# Patient Record
Sex: FEMALE | Race: WHITE | NOT HISPANIC OR LATINO | Employment: FULL TIME
[De-identification: names, ages, dates, MRNs, and addresses within clinical notes are randomized per-mention and may not be internally consistent; named-entity substitution may affect disease eponyms.]

---

## 2020-03-01 ENCOUNTER — HEALTH MAINTENANCE LETTER (OUTPATIENT)
Age: 30
End: 2020-03-01

## 2020-12-13 ENCOUNTER — HEALTH MAINTENANCE LETTER (OUTPATIENT)
Age: 30
End: 2020-12-13

## 2021-04-17 ENCOUNTER — HEALTH MAINTENANCE LETTER (OUTPATIENT)
Age: 31
End: 2021-04-17

## 2021-09-26 ENCOUNTER — HEALTH MAINTENANCE LETTER (OUTPATIENT)
Age: 31
End: 2021-09-26

## 2022-05-08 ENCOUNTER — HEALTH MAINTENANCE LETTER (OUTPATIENT)
Age: 32
End: 2022-05-08

## 2022-08-31 ENCOUNTER — HOSPITAL ENCOUNTER (EMERGENCY)
Facility: HOSPITAL | Age: 32
Discharge: HOME | End: 2022-08-31
Attending: STUDENT IN AN ORGANIZED HEALTH CARE EDUCATION/TRAINING PROGRAM | Admitting: STUDENT IN AN ORGANIZED HEALTH CARE EDUCATION/TRAINING PROGRAM
Payer: COMMERCIAL

## 2022-08-31 ENCOUNTER — APPOINTMENT (OUTPATIENT)
Dept: RADIOLOGY | Facility: HOSPITAL | Age: 32
End: 2022-08-31
Attending: STUDENT IN AN ORGANIZED HEALTH CARE EDUCATION/TRAINING PROGRAM
Payer: COMMERCIAL

## 2022-08-31 VITALS
WEIGHT: 250 LBS | HEIGHT: 64 IN | OXYGEN SATURATION: 99 % | TEMPERATURE: 98.5 F | RESPIRATION RATE: 16 BRPM | BODY MASS INDEX: 42.68 KG/M2 | HEART RATE: 90 BPM | SYSTOLIC BLOOD PRESSURE: 147 MMHG | DIASTOLIC BLOOD PRESSURE: 81 MMHG

## 2022-08-31 DIAGNOSIS — R00.0 TACHYCARDIA: ICD-10-CM

## 2022-08-31 LAB
ALBUMIN SERPL-MCNC: 4.6 G/DL (ref 3.5–5)
ALP SERPL-CCNC: 95 U/L (ref 45–120)
ALT SERPL W P-5'-P-CCNC: 18 U/L (ref 0–45)
ANION GAP SERPL CALCULATED.3IONS-SCNC: 15 MMOL/L (ref 5–18)
AST SERPL W P-5'-P-CCNC: 19 U/L (ref 0–40)
ATRIAL RATE - MUSE: 164 BPM
BASOPHILS # BLD MANUAL: 0 10E3/UL (ref 0–0.2)
BASOPHILS NFR BLD MANUAL: 0 %
BILIRUB SERPL-MCNC: 0.7 MG/DL (ref 0–1)
BUN SERPL-MCNC: 16 MG/DL (ref 8–22)
CALCIUM SERPL-MCNC: 10.1 MG/DL (ref 8.5–10.5)
CHLORIDE BLD-SCNC: 107 MMOL/L (ref 98–107)
CO2 SERPL-SCNC: 20 MMOL/L (ref 22–31)
CREAT SERPL-MCNC: 1.08 MG/DL (ref 0.6–1.1)
D DIMER PPP FEU-MCNC: 0.42 UG/ML FEU (ref 0–0.5)
DIASTOLIC BLOOD PRESSURE - MUSE: NORMAL MMHG
EOSINOPHIL # BLD MANUAL: 0 10E3/UL (ref 0–0.7)
EOSINOPHIL NFR BLD MANUAL: 0 %
ERYTHROCYTE [DISTWIDTH] IN BLOOD BY AUTOMATED COUNT: 12.4 % (ref 10–15)
GFR SERPL CREATININE-BSD FRML MDRD: 69 ML/MIN/1.73M2
GLUCOSE BLD-MCNC: 149 MG/DL (ref 70–125)
HCT VFR BLD AUTO: 42.8 % (ref 35–47)
HGB BLD-MCNC: 14.6 G/DL (ref 11.7–15.7)
HOLD SPECIMEN: NORMAL
INTERPRETATION ECG - MUSE: NORMAL
LYMPHOCYTES # BLD MANUAL: 4.9 10E3/UL (ref 0.8–5.3)
LYMPHOCYTES NFR BLD MANUAL: 29 %
MAGNESIUM SERPL-MCNC: 1.9 MG/DL (ref 1.8–2.6)
MCH RBC QN AUTO: 28.9 PG (ref 26.5–33)
MCHC RBC AUTO-ENTMCNC: 34.1 G/DL (ref 31.5–36.5)
MCV RBC AUTO: 85 FL (ref 78–100)
MONOCYTES # BLD MANUAL: 1.2 10E3/UL (ref 0–1.3)
MONOCYTES NFR BLD MANUAL: 7 %
NEUTROPHILS # BLD MANUAL: 10.8 10E3/UL (ref 1.6–8.3)
NEUTROPHILS NFR BLD MANUAL: 64 %
P AXIS - MUSE: 58 DEGREES
PLAT MORPH BLD: ABNORMAL
PLATELET # BLD AUTO: 543 10E3/UL (ref 150–450)
POTASSIUM BLD-SCNC: 3.2 MMOL/L (ref 3.5–5)
PR INTERVAL - MUSE: 114 MS
PROT SERPL-MCNC: 8.7 G/DL (ref 6–8)
QRS DURATION - MUSE: 66 MS
QT - MUSE: 300 MS
QTC - MUSE: 495 MS
R AXIS - MUSE: 72 DEGREES
RBC # BLD AUTO: 5.06 10E6/UL (ref 3.8–5.2)
RBC MORPH BLD: ABNORMAL
SODIUM SERPL-SCNC: 142 MMOL/L (ref 136–145)
SYSTOLIC BLOOD PRESSURE - MUSE: NORMAL MMHG
T AXIS - MUSE: 63 DEGREES
TROPONIN I SERPL-MCNC: 0.01 NG/ML (ref 0–0.29)
TSH SERPL DL<=0.005 MIU/L-ACNC: 1.69 UIU/ML (ref 0.3–5)
VENTRICULAR RATE- MUSE: 164 BPM
WBC # BLD AUTO: 16.9 10E3/UL (ref 4–11)

## 2022-08-31 PROCEDURE — 99285 EMERGENCY DEPT VISIT HI MDM: CPT | Mod: 25

## 2022-08-31 PROCEDURE — 80053 COMPREHEN METABOLIC PANEL: CPT | Performed by: STUDENT IN AN ORGANIZED HEALTH CARE EDUCATION/TRAINING PROGRAM

## 2022-08-31 PROCEDURE — 93005 ELECTROCARDIOGRAM TRACING: CPT | Performed by: STUDENT IN AN ORGANIZED HEALTH CARE EDUCATION/TRAINING PROGRAM

## 2022-08-31 PROCEDURE — 96374 THER/PROPH/DIAG INJ IV PUSH: CPT

## 2022-08-31 PROCEDURE — 84443 ASSAY THYROID STIM HORMONE: CPT | Performed by: STUDENT IN AN ORGANIZED HEALTH CARE EDUCATION/TRAINING PROGRAM

## 2022-08-31 PROCEDURE — 82040 ASSAY OF SERUM ALBUMIN: CPT | Performed by: STUDENT IN AN ORGANIZED HEALTH CARE EDUCATION/TRAINING PROGRAM

## 2022-08-31 PROCEDURE — 250N000011 HC RX IP 250 OP 636: Performed by: STUDENT IN AN ORGANIZED HEALTH CARE EDUCATION/TRAINING PROGRAM

## 2022-08-31 PROCEDURE — 85027 COMPLETE CBC AUTOMATED: CPT | Performed by: STUDENT IN AN ORGANIZED HEALTH CARE EDUCATION/TRAINING PROGRAM

## 2022-08-31 PROCEDURE — 250N000013 HC RX MED GY IP 250 OP 250 PS 637: Performed by: STUDENT IN AN ORGANIZED HEALTH CARE EDUCATION/TRAINING PROGRAM

## 2022-08-31 PROCEDURE — 84484 ASSAY OF TROPONIN QUANT: CPT | Performed by: STUDENT IN AN ORGANIZED HEALTH CARE EDUCATION/TRAINING PROGRAM

## 2022-08-31 PROCEDURE — 85379 FIBRIN DEGRADATION QUANT: CPT | Performed by: STUDENT IN AN ORGANIZED HEALTH CARE EDUCATION/TRAINING PROGRAM

## 2022-08-31 PROCEDURE — 258N000003 HC RX IP 258 OP 636: Performed by: STUDENT IN AN ORGANIZED HEALTH CARE EDUCATION/TRAINING PROGRAM

## 2022-08-31 PROCEDURE — 36415 COLL VENOUS BLD VENIPUNCTURE: CPT | Performed by: STUDENT IN AN ORGANIZED HEALTH CARE EDUCATION/TRAINING PROGRAM

## 2022-08-31 PROCEDURE — 85007 BL SMEAR W/DIFF WBC COUNT: CPT | Performed by: STUDENT IN AN ORGANIZED HEALTH CARE EDUCATION/TRAINING PROGRAM

## 2022-08-31 PROCEDURE — 96361 HYDRATE IV INFUSION ADD-ON: CPT

## 2022-08-31 PROCEDURE — 83735 ASSAY OF MAGNESIUM: CPT | Performed by: STUDENT IN AN ORGANIZED HEALTH CARE EDUCATION/TRAINING PROGRAM

## 2022-08-31 PROCEDURE — 71046 X-RAY EXAM CHEST 2 VIEWS: CPT

## 2022-08-31 RX ORDER — LORAZEPAM 2 MG/ML
1 INJECTION INTRAMUSCULAR ONCE
Status: COMPLETED | OUTPATIENT
Start: 2022-08-31 | End: 2022-08-31

## 2022-08-31 RX ORDER — POTASSIUM CHLORIDE 1500 MG/1
40 TABLET, EXTENDED RELEASE ORAL ONCE
Status: COMPLETED | OUTPATIENT
Start: 2022-08-31 | End: 2022-08-31

## 2022-08-31 RX ADMIN — SODIUM CHLORIDE, POTASSIUM CHLORIDE, SODIUM LACTATE AND CALCIUM CHLORIDE 1000 ML: 600; 310; 30; 20 INJECTION, SOLUTION INTRAVENOUS at 12:14

## 2022-08-31 RX ADMIN — LORAZEPAM 1 MG: 2 INJECTION INTRAMUSCULAR; INTRAVENOUS at 12:14

## 2022-08-31 RX ADMIN — POTASSIUM CHLORIDE 40 MEQ: 1500 TABLET, EXTENDED RELEASE ORAL at 14:15

## 2022-08-31 ASSESSMENT — ENCOUNTER SYMPTOMS
LIGHT-HEADEDNESS: 1
NAUSEA: 0
DYSURIA: 0
NERVOUS/ANXIOUS: 1
PALPITATIONS: 1
SHORTNESS OF BREATH: 1
SORE THROAT: 0
CHILLS: 0
VOMITING: 0
DIZZINESS: 0
ABDOMINAL PAIN: 0
SPEECH DIFFICULTY: 0
COUGH: 0
NUMBNESS: 0
DIARRHEA: 0
FEVER: 0
WEAKNESS: 0
CHEST TIGHTNESS: 1
HEADACHES: 0

## 2022-08-31 ASSESSMENT — ACTIVITIES OF DAILY LIVING (ADL): ADLS_ACUITY_SCORE: 35

## 2022-08-31 NOTE — ED NOTES
ER DISCHARGE NOTE:     Magi Trujillor  MRN: [id number]  (R00.0) Tachycardia        Magi Trujillor discharged via on foot with .    Verbalized understanding.    AVS in hand.         08/31/22 1330   Departure Condition   Departure Condition Stable   Mobility at Departure Ambulatory   Departure Mode By self;With spouse / significant other   Vital Signs   BP (!) 147/81   BP - Mean 105   Patient Position Lying   Pulse 90   Pulse Rate Source Monitor   Resp 16   SpO2 99 %   O2 Device None (Room air)   Capillary Refill, General less than/equal to 3 secs   [place] Coma Scale   Best Eye Response 4-->(E4) spontaneous   Best Motor Response 6-->(M6) obeys commands   Best Verbal Response 5-->(V5) oriented   [place] Coma Scale Score 15

## 2022-08-31 NOTE — ED PROVIDER NOTES
"Emergency Department Midlevel Supervisory Note     I personally saw the patient and performed a substantive portion of the visit including all aspects of the medical decision making.    ED Course:  11:56 AM Muna Wagner PA-C staffed patient with me. I agree with their assessment and plan of management, and I will see the patient.  12:08 PM I met with the patient to introduce myself, gather additional history, perform my initial exam, and discuss the plan.   12:51 PM Checked in on and updated patient. Patient has significantly improved.       Brief HPI:     Magi Messina is a 32 year old female who presents for evaluation of tachycardia. Patient was at home working on the computer when she started feeling her heart race. Patient stated she felt an increase in stress over the past couple of weeks. Patient had COVID-19 two weeks prior.     I, Magi Flower, am serving as a scribe to document services personally performed by Denny Barajas DO., based on my observations and the provider's statements to me.   I, Denny Barajas DO., attest that Magi Flower was acting in a scribe capacity, has observed my performance of the services and has documented them in accordance with my direction.      Brief Physical Exam: BP (!) 143/81   Pulse 95   Temp 98.5  F (36.9  C) (Oral)   Resp 15   Ht 1.626 m (5' 4\")   Wt 113.4 kg (250 lb)   SpO2 99%   BMI 42.91 kg/m    Constitutional:  Alert, in no acute distress  EYES: Conjunctivae clear  HENT:  Atraumatic, normocephalic  Respiratory:  Respirations even, unlabored, in no acute respiratory distress  Cardiovascular:  tachycardia, good peripheral perfusion  GI: Soft, nondistended, nontender, no palpable masses, no rebound, no guarding   Musculoskeletal:  No edema. No cyanosis. Range of motion major extremities intact.    Integument: Warm, Dry, No erythema, No rash.   Neurologic:  Alert & oriented, no focal deficits noted  Psych: Normal mood and affect       MDM:      Patient is a morbidly " obese 32-year-old no chronic medical problems, here with sudden onset panic-like sensation.  Tachycardia.  Palpitations.  States has had increased stress in her life.  Otherwise have been doing well this week otherwise.    On arrival here patient's heart rates in the 170s.  She looks well clinically.  Heart rate is regular.  Her blood pressure is normal.  She has no fever.  She has normal oxygenation.  She looks mildly anxious.    Examination otherwise unremarkable.  Plan for basic labs, rule out PE as well as thyroid storm also try small dose of Ativan and fluids and then reevaluate patient.    - Reevaluation approxi-1 PM: Patient is feeling much better.  Heart rates in the 90s.  Awaiting medical work-up.  - patient signed out to oncoming doc pending workup    Diagnosis: Tachycardia.    Labs and Imaging:     I have reviewed the relevant laboratory and radiology studies      Procedures:  I was present for the key portions of this procedure: none    Denny Barajas DO.  [facility] EMERGENCY DEPARTMENT  [facility] [facility] [address] [phone]       Denny Barajas DO  08/31/22 0865

## 2022-08-31 NOTE — DISCHARGE INSTRUCTIONS
Please bring this paperwork with you to your follow-up appointment.    You were seen in the urgent care/emergency department for heart racing and shortness of breath.     Although we are unsure of the cause of your symptoms, we were able to rule out any emergent causes. Your blood work and chest xray look good today. We do not think you are having a heart attack or clot in your lungs. Your potassium was a little low today, but otherwise your electrolytes look normal.     It is possible that this was a panic attack or caused by anxiety. It might be a good idea to follow up with your regular doctor about changing or adding to your anxiety medication for break through panic episodes. They may also want to refer you to a therapist.     For your symptoms:  Please stay well hydrated to prevent dehydration.     Tylenol/ibuprofen as needed for chest discomfort  You may take up to 650 mg of Tylenol (acetaminophen) up to 4 times daily and up to 600 mg of ibuprofen up to 4 times daily as needed for fever, pain.  Please do not take more than the daily maximum recommended dose (tylenol = 4 grams, ibuprofen = 2.4 grams) as it can cause harm to your liver, kidneys, stomach.  It is best to take ibuprofen with food. Please read labels of any over-the-counter medicine you may be taking as it may contain Tylenol (acetaminophen) or Advil (ibuprofen).     Follow up with your primary care provider for recheck in 3 days for ER follow up.    Return to the emergency department if you develop worsening chest pain, shortness of breath, passing out, or any other new worsening or concerning symptoms. We'd be happy to see you again.    Thank you for allowing us to be part of your care today.    Take care!  -Magi Hidalgo PA-C

## 2022-08-31 NOTE — ED PROVIDER NOTES
EMERGENCY DEPARTMENT ENCOUNTER      NAME: Magi Messina  AGE: 32 year old female  YOB: 1990  MRN: [id number]  EVALUATION DATE & TIME: 8/31/2022 11:36 AM    PCP: [facility]    ED PROVIDER: Magi Hidalgo PA-C      Chief Complaint   Patient presents with     Tachycardia     FINAL IMPRESSION:  1. Tachycardia      MEDICAL DECISION MAKING:    Pertinent Labs & Imaging studies reviewed. (See chart for details)  Magi Messina is a 32 year old female with a pertinent history of HTN, anxiety, depression who presents to this ED for evaluation of palpitations. Acute onset of heart racing, anxiety and shortness of breath around 10:30 this morning. H/o panic attacks, but this feels different and has lasted longer than normal. Recent increase of stressors in life. Diagnosed with covid 2 weeks ago.     On my initial evaluation, patient hypertensive and tachycardic. Tachycardia started to improve even during our conversation. On physical exam patient is very anxious appearing, tearful. Otherwise, she is awake, alert, no respiratory distress. Mild increase work of breathing, but no chest wall retractions. No chest wall tenderness on palpation. Heart sounds tachycardic, but no murmurs or irregular rate. Lungs clear. No tenderness on palpation of her abdomen. 1+ edema to bilateral lower extremities.      Differential diagnosis includes ACS, PE, pneumonia, panic attack, anxiety, electrolyte disturbance, cardiac arrhythmia, infection, thyroid disorder. Emergency department workup included CBC, CMP, Mg, d-dimer, UA, TSH, urine preg, troponin. Patient was given IV fluids and Ativan with significant improvement in symptoms.    Workup generally unremarkable thus far. D-dimer negative, so low suspicion at this point for PE. EKG and troponin normal, so low suspicion for ACS. Mild hypokalemia, given oral potassium replacement as per protocol, electrolytes otherwise generally unremarkable. CXR  clear without signs of pneumonia, TSH pending, but low suspicion for thyroid storm as heart rate improved, can follow up with this as outpatient. Based on unremarkable ER workup today, suspect symptoms could be due to anxiety and panic attacks. Reassuring that symptoms have improved with our interventions here and that her workup appears to be negative for any emergent causes. Did advise patient to follow up with primary provider for further management of her anxiety including medication regimen adjustment or therapy referral. Did provide strict return precautions.     Patient has had serial examinations and notes significant improvement.     Patient was discharged in stable condition with treatment plan as below. Instructed to follow up with primary care provider in 3 days and return to the emergency department with any new or worsening of symptoms. Patient expressed understanding, feels comfortable, and is in agreement with this plan. All questions addressed prior to discharge.    ED COURSE:  11:42 AM  I reviewed the patient's chart. I met with the patient to gather history and to perform my initial exam.    I wore appropriate PPE during this encounter including: facemask & eye protection   12:03 PM  I staffed this patient case with Dr. Barajas who agrees with plan at this time and will see the patient for their history, exam, and complete evaluation.  12:34 PM rechecked patient, she is feeling much better after Ativan  1:36 PM  I rechecked the patient and updated her on results. Still feeling better.   2:07 PM I rechecked the patient and updated her on results. Feeling much improved and ready to go home. We discussed plan for discharge including treatment plan, follow-up and return precautions to emergency department.  Patient voiced understanding and in agreement with this plan.    At the conclusion of the encounter I discussed the results of all of the tests and the disposition. The questions were answered. The  patient or family acknowledged understanding and was agreeable with the care plan.     MEDICATIONS GIVEN IN THE EMERGENCY:  Medications   potassium chloride ER (KLOR-CON M) CR tablet 40 mEq (has no administration in time range)   LORazepam (ATIVAN) injection 1 mg (1 mg Intravenous Given 8/31/22 1214)   lactated ringers BOLUS 1,000 mL (0 mLs Intravenous Stopped 8/31/22 1302)       NEW PRESCRIPTIONS STARTED AT TODAY'S ER VISIT  New Prescriptions    No medications on file         =================================================================    HPI:    Patient information was obtained from: patient and  at bedside    Use of Interpretor: N/A         Magi Messina is a 32 year old female with a pertinent history of HTN, anxiety, panic attacks, depression who presents to this ED for evaluation of palpitations.     Patient reports acute onset palpitations, rapid heart beat and shortness of breath about 10:30 this morning while she was sitting at a desk working. Symptoms had rapid onset and have persisted since then. She reports feeling very anxious currently. She does have a history of panic attacks, but not for many years and never to this extent. Normally symptoms resolve on their own over a few minutes, this time symptoms have not gone away. Having associated chest tightness, lightheadedness. Denies passing out, headaches, fever, chills, abdominal pain, nausea, vomiting, urinary symptoms, vaginal bleeding or discharge, chance for pregnancy. Patient has increased stressors in her life recently as her parents are getting , she recently moved into a house 2 weeks ago and a  pipe broke flooding their basement. Admits to feeling anxious currently. Has an IUD and no sexual intercourse for 3 weeks.     Of note, diagnosed with covid 2 weeks ago.     REVIEW OF SYSTEMS:  Review of Systems   Constitutional: Negative for chills and fever.   HENT: Negative for sore throat.    Respiratory: Positive for  chest tightness and shortness of breath. Negative for cough.    Cardiovascular: Positive for chest pain and palpitations. Negative for leg swelling.   Gastrointestinal: Negative for abdominal pain, diarrhea, nausea and vomiting.   Genitourinary: Negative for dysuria, menstrual problem, pelvic pain, vaginal bleeding and vaginal discharge.   Neurological: Positive for light-headedness. Negative for dizziness, syncope, speech difficulty, weakness, numbness and headaches.   Psychiatric/Behavioral: The patient is nervous/anxious.    All other systems reviewed and are negative.       PAST MEDICAL HISTORY:  Past Medical History:   Diagnosis Date     Allergic rhinitis      Anxiety 1/16/2014     Asthma      Bruxism (teeth grinding) 2/22/2013    Wears a mouth guard      Depression with anxiety 2/6/2015    Has been on Lexapro Didn't like prozac - was on this for migraines - made her feel weird.     Dysmenorrhea      Encounter for IUD insertion 2/10/2016    Done in 4/2015     Headaches        PAST SURGICAL HISTORY:  Past Surgical History:   Procedure Laterality Date     LASIK CUSTOMVUE BILATERAL         CURRENT MEDICATIONS:      Current Facility-Administered Medications:      potassium chloride ER (KLOR-CON M) CR tablet 40 mEq, 40 mEq, Oral, Once, Ohl, Denny Gabriel, DO  No current outpatient medications on file.    ALLERGIES:  Allergies   Allergen Reactions     Nkda [No Known Drug Allergies]        FAMILY HISTORY:  Family History   Problem Relation Age of Onset     Unknown/Adopted No family hx of      Diabetes Other         biological mother has Type 2       SOCIAL HISTORY:   Social History     Socioeconomic History     Marital status:    Occupational History     Employer: [employer]     Comment: Loan      Employer: [employer]     Comment: [employer], [employer]   Tobacco Use     Smoking status: Never Smoker     Smokeless tobacco: Never Used   Substance and Sexual Activity     Alcohol use: Yes  "    Comment: occ (2 X per month)     Drug use: No     Sexual activity: Yes     Partners: Male     Comment: depo   Other Topics Concern      Service No     Blood Transfusions No     Caffeine Concern No     Comment: occ coffee (2 X per week)     Occupational Exposure No     Hobby Hazards No     Sleep Concern No     Stress Concern No     Weight Concern No     Special Diet No     Back Care No     Exercise Yes     Comment: 1-2 x per wk; walking, active at work     Bike Helmet Yes     Seat Belt Yes     Comment: 100%     Self-Exams Yes     Comment: 1 X per wk self breast exam   Social History Narrative    Student at [employer].  Graduates 5/2010.         VITALS:  Patient Vitals for the past 24 hrs:   BP Temp Temp src Pulse Resp SpO2 Height Weight   08/31/22 1330 (!) 147/81 -- -- 90 16 99 % -- --   08/31/22 1300 (!) 143/81 -- -- 95 15 99 % -- --   08/31/22 1245 -- -- -- 100 15 99 % -- --   08/31/22 1230 138/75 -- -- 103 13 99 % -- --   08/31/22 1215 -- -- -- (!) 122 16 100 % -- --   08/31/22 1200 126/58 -- -- (!) 137 15 100 % -- --   08/31/22 1145 (!) 155/71 -- -- (!) 157 20 99 % -- --   08/31/22 1142 -- 98.5  F (36.9  C) Oral -- -- -- -- --   08/31/22 1133 (!) 142/67 -- -- (!) 175 20 98 % 1.626 m (5' 4\") 113.4 kg (250 lb)       PHYSICAL EXAM    Constitutional: Well developed, Well nourished, NAD, very anxious appearing, tearful  HENT: Normocephalic, Atraumatic, Bilateral external ears normal, Oropharynx normal, mucous membranes moist, Nose normal.   Neck: Normal range of motion, No tenderness, Supple, No stridor.  Eyes: PERRL, EOMI, Conjunctiva normal, No discharge.   Respiratory: Normal breath sounds, No respiratory distress, No wheezing, Speaks full sentences easily. No cough.  Cardiovascular: tachycardic, Regular rhythm, No murmurs, No rubs, No gallops. Chest wall nontender.  GI: Soft, No tenderness, No masses, No flank tenderness. No rebound or guarding.  Musculoskeletal: 2+ DP pulses. 1+ edema. No " cyanosis, No clubbing. Good range of motion in all major joints. No tenderness to palpation or major deformities noted. No tenderness of the CTLS spine.   Integument: Warm, Dry, No erythema, No rash. No petechiae.  Neurologic: Alert & oriented x 3, Normal motor function, Normal sensory function, No focal deficits noted. Normal gait.  Psychiatric: Affect normal, Judgment normal, Mood normal. Cooperative.    LAB:  All pertinent labs reviewed and interpreted.  Labs Ordered and Resulted from Time of ED Arrival to Time of ED Departure   COMPREHENSIVE METABOLIC PANEL - Abnormal       Result Value    Sodium 142      Potassium 3.2 (*)     Chloride 107      Carbon Dioxide (CO2) 20 (*)     Anion Gap 15      Urea Nitrogen 16      Creatinine 1.08      Calcium 10.1      Glucose 149 (*)     Alkaline Phosphatase 95      AST 19      ALT 18      Protein Total 8.7 (*)     Albumin 4.6      Bilirubin Total 0.7      GFR Estimate 69     CBC WITH PLATELETS AND DIFFERENTIAL - Abnormal    WBC Count 16.9 (*)     RBC Count 5.06      Hemoglobin 14.6      Hematocrit 42.8      MCV 85      MCH 28.9      MCHC 34.1      RDW 12.4      Platelet Count 543 (*)    D DIMER QUANTITATIVE - Normal    D-Dimer Quantitative 0.42     MAGNESIUM - Normal    Magnesium 1.9     TROPONIN I - Normal    Troponin I 0.01     HCG QUALITATIVE URINE   TSH WITH FREE T4 REFLEX       RADIOLOGY:  Reviewed all pertinent imaging. Please see official radiology report.  Chest XR,  PA & LAT   Final Result   IMPRESSION: Lungs are clear. Heart and pulmonary vascularity are normal. No signs of acute disease.          EKG:    Performed at: 1143  Rate: 164   Impression: sinus tachycardia    I have reviewed and interpreted the EKG(s) documented above along with Dr. Barajas.      Diagnosis:  1. Tachycardia          Magi Hidalgo PA-C  Emergency Medicine  [facility]  8/31/2022       Magi Hidalgo PA-C  08/31/22 1403

## 2022-08-31 NOTE — ED NOTES
From triage with  for c/o panic attack.   Hx of anxiety and HTN, takes Zoloft and Nifedipine.  Having palpitations since 1045.    Arrives alert and oriented.  Tachypneic.    C/o chest pain.

## 2022-08-31 NOTE — ED TRIAGE NOTES
"Patient walked into triage, ector, states she is having a panic attack. She was at computer at home and felt her heart is racing, having chest pain, \"I have had panic attacks in past, but not for awhile\" heart rate at 170's brought back to room 1     Triage Assessment     Row Name 08/31/22 5769       Triage Assessment (Adult)    Airway WDL WDL       Respiratory WDL    Respiratory WDL WDL       Skin Circulation/Temperature WDL    Skin Circulation/Temperature WDL WDL       Cardiac WDL    Cardiac WDL chest pain       Peripheral/Neurovascular WDL    Peripheral Neurovascular WDL WDL       Cognitive/Neuro/Behavioral WDL    Cognitive/Neuro/Behavioral WDL WDL              "

## 2023-01-14 ENCOUNTER — HEALTH MAINTENANCE LETTER (OUTPATIENT)
Age: 33
End: 2023-01-14

## 2023-04-23 ENCOUNTER — HEALTH MAINTENANCE LETTER (OUTPATIENT)
Age: 33
End: 2023-04-23

## 2024-06-30 ENCOUNTER — HEALTH MAINTENANCE LETTER (OUTPATIENT)
Age: 34
End: 2024-06-30

## 2025-07-13 ENCOUNTER — HEALTH MAINTENANCE LETTER (OUTPATIENT)
Age: 35
End: 2025-07-13